# Patient Record
Sex: FEMALE | URBAN - METROPOLITAN AREA
[De-identification: names, ages, dates, MRNs, and addresses within clinical notes are randomized per-mention and may not be internally consistent; named-entity substitution may affect disease eponyms.]

---

## 2023-03-23 ENCOUNTER — TELEPHONE (OUTPATIENT)
Dept: PLASTIC SURGERY | Facility: CLINIC | Age: 38
End: 2023-03-23

## 2023-03-23 NOTE — TELEPHONE ENCOUNTER
Spoke with patient. Referral has not been rcvd at this time, currently her Bmi is 43. She will need to get under 36 to schedule.

## 2023-03-23 NOTE — TELEPHONE ENCOUNTER
----- Message from Herminia Levine sent at 3/23/2023  1:28 PM CDT -----  Contact: self  Pls call [phone] pt needs to schedule appt for  breast reduction, has referral from . Currently has [employer] insurance and wants to know if the visit will be covered at your clinic, pt didn't have member id# on her